# Patient Record
Sex: MALE | Race: WHITE | Employment: FULL TIME | URBAN - METROPOLITAN AREA
[De-identification: names, ages, dates, MRNs, and addresses within clinical notes are randomized per-mention and may not be internally consistent; named-entity substitution may affect disease eponyms.]

---

## 2022-03-06 ENCOUNTER — APPOINTMENT (OUTPATIENT)
Dept: GENERAL RADIOLOGY | Age: 37
End: 2022-03-06
Attending: EMERGENCY MEDICINE
Payer: COMMERCIAL

## 2022-03-06 ENCOUNTER — HOSPITAL ENCOUNTER (EMERGENCY)
Age: 37
Discharge: HOME | End: 2022-03-06
Attending: EMERGENCY MEDICINE
Payer: COMMERCIAL

## 2022-03-06 VITALS
SYSTOLIC BLOOD PRESSURE: 140 MMHG | WEIGHT: 145 LBS | OXYGEN SATURATION: 96 % | HEIGHT: 66 IN | DIASTOLIC BLOOD PRESSURE: 85 MMHG | RESPIRATION RATE: 18 BRPM | TEMPERATURE: 98 F | HEART RATE: 77 BPM | BODY MASS INDEX: 23.3 KG/M2

## 2022-03-06 DIAGNOSIS — S40.011A CONTUSION OF RIGHT SHOULDER, INITIAL ENCOUNTER: Primary | ICD-10-CM

## 2022-03-06 PROCEDURE — 73030 X-RAY EXAM OF SHOULDER: CPT | Performed by: EMERGENCY MEDICINE

## 2022-03-06 PROCEDURE — 99283 EMERGENCY DEPT VISIT LOW MDM: CPT

## 2022-03-07 NOTE — ED INITIAL ASSESSMENT (HPI)
Pt to ed with c/o right shoulder pain and limited ROM after a hockey game, PT states he ran into the boards with right shoulder.

## 2023-11-29 ENCOUNTER — OFFICE VISIT (OUTPATIENT)
Dept: FAMILY MEDICINE CLINIC | Facility: CLINIC | Age: 38
End: 2023-11-29
Payer: COMMERCIAL

## 2023-11-29 VITALS
SYSTOLIC BLOOD PRESSURE: 110 MMHG | HEART RATE: 78 BPM | OXYGEN SATURATION: 98 % | RESPIRATION RATE: 16 BRPM | BODY MASS INDEX: 24.16 KG/M2 | DIASTOLIC BLOOD PRESSURE: 74 MMHG | WEIGHT: 145 LBS | HEIGHT: 65 IN

## 2023-11-29 DIAGNOSIS — R35.0 FREQUENT URINATION: ICD-10-CM

## 2023-11-29 DIAGNOSIS — Z13.220 LIPID SCREENING: ICD-10-CM

## 2023-11-29 DIAGNOSIS — Z13.0 SCREENING FOR DEFICIENCY ANEMIA: ICD-10-CM

## 2023-11-29 DIAGNOSIS — Z13.29 SCREENING FOR THYROID DISORDER: ICD-10-CM

## 2023-11-29 DIAGNOSIS — Z00.00 WELLNESS EXAMINATION: Primary | ICD-10-CM

## 2023-11-29 PROCEDURE — 3008F BODY MASS INDEX DOCD: CPT | Performed by: FAMILY MEDICINE

## 2023-11-29 PROCEDURE — 99386 PREV VISIT NEW AGE 40-64: CPT | Performed by: FAMILY MEDICINE

## 2023-11-29 PROCEDURE — 3078F DIAST BP <80 MM HG: CPT | Performed by: FAMILY MEDICINE

## 2023-11-29 PROCEDURE — 3074F SYST BP LT 130 MM HG: CPT | Performed by: FAMILY MEDICINE

## 2024-06-28 ENCOUNTER — OFFICE VISIT (OUTPATIENT)
Dept: FAMILY MEDICINE CLINIC | Facility: CLINIC | Age: 39
End: 2024-06-28

## 2024-06-28 VITALS
SYSTOLIC BLOOD PRESSURE: 102 MMHG | HEART RATE: 48 BPM | WEIGHT: 147 LBS | BODY MASS INDEX: 24 KG/M2 | DIASTOLIC BLOOD PRESSURE: 60 MMHG | RESPIRATION RATE: 16 BRPM | OXYGEN SATURATION: 98 %

## 2024-06-28 DIAGNOSIS — Z30.09 FAMILY PLANNING: Primary | ICD-10-CM

## 2024-06-28 DIAGNOSIS — F41.9 ANXIETY AND DEPRESSION: ICD-10-CM

## 2024-06-28 DIAGNOSIS — F32.A ANXIETY AND DEPRESSION: ICD-10-CM

## 2024-06-28 PROCEDURE — 99214 OFFICE O/P EST MOD 30 MIN: CPT | Performed by: FAMILY MEDICINE

## 2024-06-28 RX ORDER — VENLAFAXINE HYDROCHLORIDE 75 MG/1
75 CAPSULE, EXTENDED RELEASE ORAL DAILY
Qty: 30 CAPSULE | Refills: 1 | Status: SHIPPED | OUTPATIENT
Start: 2024-06-28

## 2024-06-28 RX ORDER — VENLAFAXINE HYDROCHLORIDE 37.5 MG/1
37.5 CAPSULE, EXTENDED RELEASE ORAL DAILY
Qty: 14 CAPSULE | Refills: 0 | Status: SHIPPED | OUTPATIENT
Start: 2024-06-28

## 2024-06-28 NOTE — PROGRESS NOTES
[facility] Progress Note    SUBJECTIVE: Alexandr COONEY Paprzyca 38 year old male is here today for   Chief Complaint   Patient presents with    Referral     vasectomy    Depression       Wants vasectomy    Has been done, had been on lexapro before    Feeling down, depressed, lost his job recently.    Having irritability, and takes it out on family and doesn't like that.    Has been worsening, sense of hopelessness, down mood, also has anxiety    Can wake up with random memories. Hard time concentrating.    Hasn't been on any other medications, took lexapro for a couple of years 10 years ago and then again for a couple of months a year or so ago, and didn't help when recently used.    No suicidal thoguths.        PMH  No past medical history on file.     PSH  Past Surgical History:   Procedure Laterality Date    Elbow fracture surgery      [place] teeth removed          Social Hx:  No changes    ROS  See HPI    OBJECTIVE:  /60   Pulse (!) 48   Resp 16   Wt 147 lb (66.7 kg)   SpO2 98%   BMI 24.46 kg/m²         Labs:          Meds:   Current Outpatient Medications   Medication Sig Dispense Refill    venlafaxine ER 37.5 MG Oral Capsule SR 24 Hr Take 1 capsule (37.5 mg total) by mouth daily. 14 capsule 0    venlafaxine ER 75 MG Oral Capsule SR 24 Hr Take 1 capsule (75 mg total) by mouth daily. 30 capsule 1         Assessment/Plan  Alexandr Patton was seen today for referral and depression.    Diagnoses and all orders for this visit:    Family planning  -     Urology Referral - In Network    Anxiety and depression  -     [facility] Referral - In Network  -     venlafaxine ER 37.5 MG Oral Capsule SR 24 Hr; Take 1 capsule (37.5 mg total) by mouth daily.  -     venlafaxine ER 75 MG Oral Capsule SR 24 Hr; Take 1 capsule (75 mg total) by mouth daily.         Will get set up for urology for vasectomy    Discussed counseling and referred to [facility]    Will recommend starting medication advised on impact expected and  possible side effect         Total Time spent with patient and coordinating care:  25 minutes.    Follow up: 1 month      Mauricio Busch MD

## 2024-07-22 DIAGNOSIS — F41.9 ANXIETY AND DEPRESSION: ICD-10-CM

## 2024-07-22 DIAGNOSIS — F32.A ANXIETY AND DEPRESSION: ICD-10-CM

## 2024-07-22 RX ORDER — VENLAFAXINE HYDROCHLORIDE 75 MG/1
75 CAPSULE, EXTENDED RELEASE ORAL DAILY
Qty: 90 CAPSULE | Refills: 1 | Status: SHIPPED | OUTPATIENT
Start: 2024-07-22

## 2024-08-22 ENCOUNTER — OFFICE VISIT (OUTPATIENT)
Dept: SURGERY | Facility: CLINIC | Age: 39
End: 2024-08-22
Payer: COMMERCIAL

## 2024-08-22 DIAGNOSIS — N32.81 OAB (OVERACTIVE BLADDER): ICD-10-CM

## 2024-08-22 DIAGNOSIS — Z30.2 ENCOUNTER FOR STERILIZATION: Primary | ICD-10-CM

## 2024-08-22 PROCEDURE — 99204 OFFICE O/P NEW MOD 45 MIN: CPT | Performed by: SURGERY

## 2024-08-22 RX ORDER — DIAZEPAM 10 MG
10 TABLET ORAL SEE ADMIN INSTRUCTIONS
Qty: 1 TABLET | Refills: 0 | Status: SHIPPED | OUTPATIENT
Start: 2024-08-22

## 2024-08-22 NOTE — PROGRESS NOTES
Urology Clinic Note - New Patient    Referring Provider:  Mauricio Busch MD  [address] [place] [address]  [address]  [place] [address]     Primary Care Provider:  Mauricio Busch MD     Chief Complaint:   Referral for vasectomy, overactive bladder    HPI:   Alexandr Robert is a 39 year old male with no PMH referred for interest in male sterility via vasectomy.     He has 2 current children with his wife. He has thought about vasectomy for a while now and desires no further children.    Blood thinners: No  Prior bleeding problems: No  Prior scrotal surgery: No    He also notes occasional urinary urgency and urge incontinence (just a few drops typically) when he drinks energy drinks or caffeinated beverages.  He does not have the symptoms when he is not drinking caffeinated beverages.  I discussed that this is likely related to mild overactive bladder.  He denies weak urinary stream.    PSA:  No results found for: \"PSA\", \"PERCENTPSA\", \"PSAS\", \"PSAULTRA\"     History:   History reviewed. No pertinent past medical history.    Past Surgical History:   Procedure Laterality Date    Elbow fracture surgery      [place] teeth removed         Family History   Problem Relation Age of Onset    Other (brain tumor) Mother     Diabetes Mother        Social History     Socioeconomic History    Marital status:    Tobacco Use    Smoking status: Never    Smokeless tobacco: Never   Substance and Sexual Activity    Alcohol use: Yes     Comment: occ    Drug use: Never       Medications (Active prior to today's visit):  Current Outpatient Medications   Medication Sig Dispense Refill    venlafaxine ER 75 MG Oral Capsule SR 24 Hr Take 1 capsule (75 mg total) by mouth daily. 90 capsule 1       Allergies:  Allergies   Allergen Reactions    Avocado NAUSEA AND VOMITING    Banana NAUSEA AND VOMITING       Review of Systems:   A comprehensive 10-point review of systems was completed.  Pertinent positives and negatives are noted in the the  HPI.    Physical Exam:   CONSTITUTIONAL: Well developed, well nourished, in no acute distress  NEUROLOGIC: Alert and oriented  HEAD: Normocephalic, atraumatic  EYES: Sclera non-icteric  ENT: Hearing intact, moist mucous membranes  NECK: No obvious goiter or masses  RESPIRATORY: Normal respiratory effort  SKIN: No evident rashes  ABDOMEN: Soft, non-tender, non-distended  GENITOURINARY: Normal phallus, orthotopic meatus, normal bilateral testicles, palpable vasa bilaterally    Assessment & Plan:   Alexandr Robert is a 39 year old male referred for vasectomy consult.    We discussed that a vasectomy is intended to be a permanent form of contraception and that if he desires fertility after vasectomy, options included vasectomy reversal and sperm retrieval with possible in vitro fertilization. These options are not always successful and may be very expensive.    We also discussed the risks of vasectomy which include symptomatic hematoma and infection (1-2%), chronic scrotal pain (6%; caused by congestive epididymitis, sperm granuloma and/or infective epididymoorchitis), need for repeat vasectomy (<1% of cases), need for additional procedures, vasectomy failure, and unwanted pregnancy (1 in 2000 men). The chronic scrotal discomfort may be no more than a low-grade chronic ache that causes little disability and requires only symptomatic treatment. However, a small percentage of patients will be sufficiently debilitated to seek epididymectomy or even orchiectomy (removal of the epididymis and/or testicle). Furthermore, for a very small number of patients, even this radical surgery will not provide relief from their scrotal discomfort.     We discussed that he will have two small incisions in his scrotum with dissolvable sutures. He was told that vasectomy does NOT produce immediate sterility and that following vasectomy, another form of contraception is required until vas occlusion is confirmed by post-vasectomy semen  analysis. Post-vasectomy semen analysis will be obtained 10-12 weeks after the vasectomy. He was told that he may stop using other methods of contraception when examination of one well-mixed, uncentrifuged, fresh post-vasectomy semen specimen shows azoospermia or only rare non-motile sperm. He was once again told that even after vas occlusion is confirmed, vasectomy is not 100% reliable in preventing pregnancy and that the risk of pregnancy after vasectomy is approximately 1 in 2,000 men who have post-vasectomy azoospermia or semen analysis showing rare non-motile sperm. The reasons are early recanalization of the vas deferens or the presence of an accessory vas unrecognized at the time of surgery. There have also been cases of DNA-confirmed paternity despite documented azoospermia before and after conception. He was told to refrain from ejaculation for approximately one week after vasectomy.    I advised him to hold any blood thinners prior to this procedure, to trim the hair on the scrotum the day before the procedure, and to arrange for someone to drive him to and from the procedure if possible. He understands that for a few days after the procedure he will need to take it easy with minimal activity, ice the area, and keep the compression dressing/jock strap on. He understands and elects to proceed with vasectomy.    -Book for vasectomy under local anesthesia in clinic with me, next available  -Hold NSAIDs or Aspirin for 7 days before procedure  -Valium 10mg PRN before procedure (informed must have ride home if taking)  -Decrease caffeinated beverages to decrease OAB symptoms    Thank you for this consult.    I have personally reviewed all relevant medical records, labs, and imaging.      Medical Decision Making  Encounter for sterilization: Undiagnosed new problem  Overactive bladder: Undiagnosed new problem    Amount and/or Complexity of Data Reviewed  External Data Reviewed: notes.    Risk  Parenteral  controlled substances.  Minor surgery with no identified risk factors.      Gurmeet Brizuela MD  Staff Urologist  [facility]  Office: [phone]

## 2024-10-04 ENCOUNTER — PROCEDURE (OUTPATIENT)
Dept: SURGERY | Facility: CLINIC | Age: 39
End: 2024-10-04
Payer: COMMERCIAL

## 2024-10-04 VITALS — DIASTOLIC BLOOD PRESSURE: 74 MMHG | SYSTOLIC BLOOD PRESSURE: 104 MMHG

## 2024-10-04 DIAGNOSIS — Z30.2 ENCOUNTER FOR STERILIZATION: Primary | ICD-10-CM

## 2024-10-04 PROCEDURE — 55250 REMOVAL OF SPERM DUCT(S): CPT | Performed by: SURGERY

## 2024-10-04 NOTE — PROCEDURES
Clinic Procedure Note    INDICATIONS:   Alexandr Robert is a 39 year old male desiring elective sterility via bilateral vasectomy.     PROCEDURE:       1. Bilateral vasectomy    DATE OF PROCEDURE: 10/4/2024     PRE-PROCEDURE DIAGNOSIS: Family Planning/Desires Male Sterility    POST-PROCEDURE DIAGNOSIS: Same     SURGEON: Gurmeet Brizuela MD    ANESTHESIA: Local (10 mL of lidocaine 1% plain)    SPECIMENS: Segment of RIGHT vas, segment of LEFT vas    FINDINGS:  Normal bilateral vasectomy performed.    DISCUSSION:  We discussed that a vasectomy is intended to be a permanent form of contraception and that if he desires fertility after vasectomy, options included vasectomy reversal and sperm retrieval with possible in vitro fertilization. These options are not always successful and may be very expensive.    We also discussed the risks of vasectomy which include symptomatic hematoma and infection (1-2%), chronic scrotal pain (6%; caused by congestive epididymitis, sperm granuloma and/or infective epididymoorchitis), need for repeat vasectomy (<1% of cases), need for additional procedures, vasectomy failure, and unwanted pregnancy (1 in 2000 men). The chronic scrotal discomfort may be no more than a low-grade chronic ache that causes little disability and requires only symptomatic treatment. However, a small percentage of patients will be sufficiently debilitated to seek epididymectomy or even orchiectomy (removal of the epididymis and/or testicle). Furthermore, for a very small number of patients, even this radical surgery will not provide relief from their scrotal discomfort.     We discussed that he will have two small incisions in his scrotum with dissolvable sutures. He was told that vasectomy does NOT produce immediate sterility and that following vasectomy, another form of contraception is required until vas occlusion is confirmed by post-vasectomy semen analysis. Post-vasectomy semen analysis will be obtained  10-15 weeks after the vasectomy. He was told that he may stop using other methods of contraception when examination of one well-mixed, uncentrifuged, fresh post-vasectomy semen specimen shows azoospermia or only rare non-motile sperm. He was once again told that even after vas occlusion is confirmed, vasectomy is not 100% reliable in preventing pregnancy and that the risk of pregnancy after vasectomy is approximately 1 in 2,000 men who have post-vasectomy azoospermia or semen analysis showing rare non-motile sperm. The reasons are early recanalization of the vas deferens or the presence of an accessory vas unrecognized at the time of surgery. There have also been cases of DNA-confirmed paternity despite documented azoospermia before and after conception. He was told to refrain from ejaculation for approximately one week after vasectomy.    After discussion of all risks and benefits the patient elected to proceed and informed consent form was signed.    PROCEDURE:   After the appropriate time out checklist was performed, confirming the correct patient and procedure, the scrotum was prepped and draped in standard fashion.     The right vas was grasped and brought up underneath the skin surface. The skin, Dartos, and vasal sheath were injected with lidocaine. Once the skin was numb, a #15 scalpel was used to make a small incision overlying the vas in the right hemiscrotum. A small snap was used to spread the tissue overlying the vas to make room for the vas ring forceps. The ring forceps was used to grasp the vas deferens to hold it in place. I used the 15 blade to cut away the vasal sheath and used additional vas clamps to re-grasp the isolated vas.  I then pushed down any remaining attached vasal sheath and secured each end of the vas with a small mosquito clamp. Small metal clips were placed on the testicular and abdominal ends of the transected vas. A 1 cm segment of the vas was excised and passed off the field for  specimen. The needle tip Bovie was used to cauterize the lumen of both the abdominal and testicular ends of the transected vas. Any present bleeding vessels were treated with a combination of cautery and additional metals clips. There was no bleeding seen from the vasal stumps, and they were dropped back into the scrotum. The dartos muscle was cauterized. The skin incision was closed with running 3-0 chromic.     The left vas was grasped and brought up underneath the skin surface. The skin, Dartos, and vasal sheath were injected with lidocaine. Once the skin was numb, a #15 scalpel was used to make a small incision overlying the vas in the left hemiscrotum. A small snap was used to spread the tissue overlying the vas to make room for the vas ring forceps. The ring forceps was used to grasp the vas deferens to hold it in place. I used the 15 blade to cut away the vasal sheath and used additional vas clamps to re-grasp the isolated vas.  I then pushed down any remaining attached vasal sheath and secured each end of the vas with a small mosquito clamp. Small metal clips were placed on the testicular and abdominal ends of the transected vas. A 1 cm segment of the vas was excised and passed off the field for specimen. The needle tip Bovie was used to cauterize the lumen of both the abdominal and testicular ends of the transected vas. Any present bleeding vessels were treated with a combination of cautery and additional metals clips. There was no bleeding seen from the vasal stumps, and they were dropped back into the scrotum. The dartos muscle was cauterized. The skin incision was closed with running 3-0 chromic.     Fluff gauze and scrotal support were placed.     DISPOSITION: Home    FOLLOW-UP: Post-procedure care instructions were given to the patient. Post-vasectomy semen analysis will be performed 10-15 weeks after vasectomy. Patient knows to use another form of contraception until vasal occlusion is confirmed by  post-vasectomy semen analysis.      Gurmeet Brizuela MD  Staff Urologist  [facility]  Office: [phone]

## 2024-10-30 ENCOUNTER — TELEPHONE (OUTPATIENT)
Dept: SURGERY | Facility: CLINIC | Age: 39
End: 2024-10-30

## 2024-10-30 ENCOUNTER — PATIENT MESSAGE (OUTPATIENT)
Dept: SURGERY | Facility: CLINIC | Age: 39
End: 2024-10-30

## 2024-10-30 NOTE — TELEPHONE ENCOUNTER
My chart message:  Hi Dr. Brizuela,  I received a vasectomy just less than a month ago. Everything had been feeling very well until just recently. In the last few days I’ve been having minor aches in my right testicle. It’s not painful but feels more like soreness. I haven’t noticed any swelling or bruising but it’s been consistent. I take some ibuprofen that helps but it is persistent.     Please assist. Thank you.

## 2024-10-31 NOTE — TELEPHONE ENCOUNTER
This encounter is now closed.     RN replied to patient via [employer] with recommendations  RN also forwarded to PA to advise

## 2025-01-26 DIAGNOSIS — F41.9 ANXIETY AND DEPRESSION: ICD-10-CM

## 2025-01-26 DIAGNOSIS — F32.A ANXIETY AND DEPRESSION: ICD-10-CM

## 2025-01-27 RX ORDER — VENLAFAXINE HYDROCHLORIDE 75 MG/1
75 CAPSULE, EXTENDED RELEASE ORAL DAILY
Qty: 90 CAPSULE | Refills: 0 | Status: SHIPPED | OUTPATIENT
Start: 2025-01-27

## 2025-04-14 ENCOUNTER — LAB ENCOUNTER (OUTPATIENT)
Dept: LAB | Age: 40
End: 2025-04-14
Attending: FAMILY MEDICINE
Payer: COMMERCIAL

## 2025-04-14 ENCOUNTER — OFFICE VISIT (OUTPATIENT)
Dept: FAMILY MEDICINE CLINIC | Facility: CLINIC | Age: 40
End: 2025-04-14
Payer: COMMERCIAL

## 2025-04-14 VITALS
SYSTOLIC BLOOD PRESSURE: 104 MMHG | HEIGHT: 65 IN | WEIGHT: 141 LBS | HEART RATE: 62 BPM | RESPIRATION RATE: 16 BRPM | DIASTOLIC BLOOD PRESSURE: 64 MMHG | OXYGEN SATURATION: 97 % | BODY MASS INDEX: 23.49 KG/M2

## 2025-04-14 DIAGNOSIS — Z13.29 THYROID DISORDER SCREEN: ICD-10-CM

## 2025-04-14 DIAGNOSIS — Z13.0 SCREENING FOR DEFICIENCY ANEMIA: ICD-10-CM

## 2025-04-14 DIAGNOSIS — F32.A ANXIETY AND DEPRESSION: ICD-10-CM

## 2025-04-14 DIAGNOSIS — Z00.00 WELLNESS EXAMINATION: Primary | ICD-10-CM

## 2025-04-14 DIAGNOSIS — Z12.5 SCREENING FOR PROSTATE CANCER: ICD-10-CM

## 2025-04-14 DIAGNOSIS — Z13.220 LIPID SCREENING: ICD-10-CM

## 2025-04-14 DIAGNOSIS — R68.82 LIBIDO, DECREASED: ICD-10-CM

## 2025-04-14 DIAGNOSIS — F41.9 ANXIETY AND DEPRESSION: ICD-10-CM

## 2025-04-14 LAB
ALBUMIN SERPL-MCNC: 4.9 G/DL (ref 3.2–4.8)
ALBUMIN/GLOB SERPL: 2.2 {RATIO} (ref 1–2)
ALP LIVER SERPL-CCNC: 61 U/L (ref 45–117)
ALT SERPL-CCNC: 26 U/L (ref 10–49)
ANION GAP SERPL CALC-SCNC: 9 MMOL/L (ref 0–18)
AST SERPL-CCNC: 24 U/L (ref ?–34)
BASOPHILS # BLD AUTO: 0.06 X10(3) UL (ref 0–0.2)
BASOPHILS NFR BLD AUTO: 0.7 %
BILIRUB SERPL-MCNC: 0.6 MG/DL (ref 0.3–1.2)
BUN BLD-MCNC: 20 MG/DL (ref 9–23)
CALCIUM BLD-MCNC: 10.4 MG/DL (ref 8.7–10.6)
CHLORIDE SERPL-SCNC: 105 MMOL/L (ref 98–112)
CHOLEST SERPL-MCNC: 171 MG/DL (ref ?–200)
CO2 SERPL-SCNC: 28 MMOL/L (ref 21–32)
COMPLEXED PSA SERPL-MCNC: 0.51 NG/ML (ref ?–4)
CREAT BLD-MCNC: 1.11 MG/DL (ref 0.7–1.3)
EGFRCR SERPLBLD CKD-EPI 2021: 86 ML/MIN/1.73M2 (ref 60–?)
EOSINOPHIL # BLD AUTO: 0.37 X10(3) UL (ref 0–0.7)
EOSINOPHIL NFR BLD AUTO: 4.1 %
ERYTHROCYTE [DISTWIDTH] IN BLOOD BY AUTOMATED COUNT: 12 %
FASTING PATIENT LIPID ANSWER: NO
FASTING STATUS PATIENT QL REPORTED: NO
GLOBULIN PLAS-MCNC: 2.2 G/DL (ref 2–3.5)
GLUCOSE BLD-MCNC: 107 MG/DL (ref 70–99)
HCT VFR BLD AUTO: 46.6 % (ref 39–53)
HDLC SERPL-MCNC: 61 MG/DL (ref 40–59)
HGB BLD-MCNC: 16.1 G/DL (ref 13–17.5)
IMM GRANULOCYTES # BLD AUTO: 0.03 X10(3) UL (ref 0–1)
IMM GRANULOCYTES NFR BLD: 0.3 %
LDLC SERPL CALC-MCNC: 91 MG/DL (ref ?–100)
LYMPHOCYTES # BLD AUTO: 2.46 X10(3) UL (ref 1–4)
LYMPHOCYTES NFR BLD AUTO: 27.1 %
MCH RBC QN AUTO: 32.2 PG (ref 26–34)
MCHC RBC AUTO-ENTMCNC: 34.5 G/DL (ref 31–37)
MCV RBC AUTO: 93.2 FL (ref 80–100)
MONOCYTES # BLD AUTO: 0.67 X10(3) UL (ref 0.1–1)
MONOCYTES NFR BLD AUTO: 7.4 %
NEUTROPHILS # BLD AUTO: 5.48 X10 (3) UL (ref 1.5–7.7)
NEUTROPHILS # BLD AUTO: 5.48 X10(3) UL (ref 1.5–7.7)
NEUTROPHILS NFR BLD AUTO: 60.4 %
NONHDLC SERPL-MCNC: 110 MG/DL (ref ?–130)
OSMOLALITY SERPL CALC.SUM OF ELEC: 297 MOSM/KG (ref 275–295)
PLATELET # BLD AUTO: 267 10(3)UL (ref 150–450)
POTASSIUM SERPL-SCNC: 4.7 MMOL/L (ref 3.5–5.1)
PROT SERPL-MCNC: 7.1 G/DL (ref 5.7–8.2)
RBC # BLD AUTO: 5 X10(6)UL (ref 4.3–5.7)
SODIUM SERPL-SCNC: 142 MMOL/L (ref 136–145)
T3FREE SERPL-MCNC: 2.82 PG/ML (ref 2.4–4.2)
T4 FREE SERPL-MCNC: 1.1 NG/DL (ref 0.8–1.7)
TESTOST SERPL-MCNC: 428.66 NG/DL (ref 197.44–669.58)
TRIGL SERPL-MCNC: 109 MG/DL (ref 30–149)
TSI SER-ACNC: 0.54 UIU/ML (ref 0.55–4.78)
VLDLC SERPL CALC-MCNC: 18 MG/DL (ref 0–30)
WBC # BLD AUTO: 9.1 X10(3) UL (ref 4–11)

## 2025-04-14 PROCEDURE — 80061 LIPID PANEL: CPT | Performed by: FAMILY MEDICINE

## 2025-04-14 PROCEDURE — 84443 ASSAY THYROID STIM HORMONE: CPT | Performed by: FAMILY MEDICINE

## 2025-04-14 PROCEDURE — 84439 ASSAY OF FREE THYROXINE: CPT | Performed by: FAMILY MEDICINE

## 2025-04-14 PROCEDURE — 36415 COLL VENOUS BLD VENIPUNCTURE: CPT | Performed by: FAMILY MEDICINE

## 2025-04-14 PROCEDURE — 80053 COMPREHEN METABOLIC PANEL: CPT | Performed by: FAMILY MEDICINE

## 2025-04-14 PROCEDURE — 84481 FREE ASSAY (FT-3): CPT | Performed by: FAMILY MEDICINE

## 2025-04-14 PROCEDURE — 84403 ASSAY OF TOTAL TESTOSTERONE: CPT | Performed by: FAMILY MEDICINE

## 2025-04-14 PROCEDURE — 85025 COMPLETE CBC W/AUTO DIFF WBC: CPT | Performed by: FAMILY MEDICINE

## 2025-04-14 PROCEDURE — 99396 PREV VISIT EST AGE 40-64: CPT | Performed by: FAMILY MEDICINE

## 2025-04-14 RX ORDER — CHLORHEXIDINE GLUCONATE ORAL RINSE 1.2 MG/ML
SOLUTION DENTAL
COMMUNITY
Start: 2025-04-12

## 2025-04-14 NOTE — PROGRESS NOTES
HPI:   Alexandr Robert is a 39 year old male who presents for an Annual Health Visit.     Had a vasectomy in October, about a month ago had a pain in one of his testicles, thought it was due to working out but persisted. Riding a bike was uncomfortable, and running too.    The venlafaxine hadn't caused sexual dysfunction. Usd to be motivatd somewhat with fear of weight gain, and exercised more. Feels like motivation down, and losing his drive. Weight stable, but feels like rebalanced.    Libido has been down since this year. Was ill around xmas into new year. Seems like since then, was negative for covid and flu.    Has jareth feeling more down lately. Not so much about his own life.    Social:  Mom has been sick.      Allergies:     Allergies   Allergen Reactions    Avocado NAUSEA AND VOMITING    Banana NAUSEA AND VOMITING       CURRENT MEDICATIONS   Current Outpatient Medications   Medication Sig Dispense Refill    chlorhexidine gluconate 0.12 % Mouth/Throat Solution       venlafaxine ER 75 MG Oral Capsule SR 24 Hr TAKE 1 CAPSULE BY MOUTH EVERY DAY 90 capsule 0      HISTORICAL INFORMATION   History reviewed. No pertinent past medical history.   Past Surgical History:   Procedure Laterality Date    Elbow fracture surgery      [place] teeth removed        Family History   Problem Relation Age of Onset    Other (brain tumor) Mother     Diabetes Mother       SOCIAL HISTORY   Social History     Socioeconomic History    Marital status:    Tobacco Use    Smoking status: Never    Smokeless tobacco: Never   Substance and Sexual Activity    Alcohol use: Yes     Comment: occ    Drug use: Never     Social History     Social History Narrative    Not on file        REVIEW OF SYSTEMS:     Constitutional: negative  Eyes:  had some vision changes in the fall, and followed with eye doctor. Resolved after a while.  ENT: negative  Respiratory: negative  Cardiovascular: negative  Gastrointestinal:  negative  Integument/Breast: negative  Genitourinary:  frequent urination  Heme/Lymph: negative  Musculoskeletal:  hands feel puffy, dull soreness.  Neurological: negative  Psych:  see HPI, will check labs and adjust meds if normal  Endocrine: negative  Allergic/Immune: negative    EXAM:   /64   Pulse 62   Resp 16   Ht 5' 5\" (1.651 m)   Wt 141 lb (64 kg)   SpO2 97%   BMI 23.46 kg/m²    Wt Readings from Last 6 Encounters:   04/14/25 141 lb (64 kg)   06/28/24 147 lb (66.7 kg)   11/29/23 145 lb (65.8 kg)   03/06/22 145 lb (65.8 kg)     Body mass index is 23.46 kg/m².    General: alert, appears stated age, and cooperative  Head: Normocephalic, without obvious abnormality, atraumatic  Eyes: conjunctivae/corneas clear. PERRL, EOM's intact. Fundi benign.  Ears: normal TM's and external ear canals both ears  Nose: Nares normal. Septum midline. Mucosa normal. No drainage or sinus tenderness.  Throat: lips, mucosa, and tongue normal; teeth and gums normal  Neck: no adenopathy, no carotid bruit, no JVD, supple, symmetrical, trachea midline, and thyroid not enlarged, symmetric, no tenderness/mass/nodules  Heart: S1, S2 normal, no murmur, click, rub or gallop, regular rate and rhythm  Lungs: clear to auscultation bilaterally  Chest wall: no tenderness  Abdomen: soft, non-tender; bowel sounds normal; no masses,  no organomegaly  : deferred  Back: symmetric, no curvature. ROM normal. No CVA tenderness.  Extremities: extremities normal, atraumatic, no cyanosis or edema  Pulses: 2+ and symmetric  Skin: Skin color, texture, turgor normal. No rashes or lesions  Lymph Nodes: Cervical, supraclavicular, and axillary nodes normal.  Neurologic: Grossly normal    ASSESSMENT AND PLAN:   Diagnoses and all orders for this visit:    Wellness examination  -     Comp Metabolic Panel (14)  -     CBC With Differential With Platelet  -     Lipid Panel  -     TSH W Reflex To Free T4    Lipid screening  -     Lipid Panel    Screening for  deficiency anemia  -     CBC With Differential With Platelet    Thyroid disorder screen  -     TSH W Reflex To Free T4    Libido, decreased  -     Testosterone Total [E]    Screening for prostate cancer  -     PSA, Total (Screening) [E]; Future    Anxiety and depression    Mood not optimally controlled, there are some factors impacting this, but plan on labs and if normal will adjust venlafaxine down and see how he does, and if improved consider new medication if worsened could push to 150 mg instead    If testicle pain returns consider US  There are no Patient Instructions on file for this visit.    The patient indicates understanding of these issues and agrees to the plan.    Problem List:  There is no problem list on file for this patient.      Mauricio Busch MD  4/14/2025  9:32 AM

## 2025-04-29 DIAGNOSIS — F32.A ANXIETY AND DEPRESSION: ICD-10-CM

## 2025-04-29 DIAGNOSIS — F41.9 ANXIETY AND DEPRESSION: ICD-10-CM

## 2025-04-29 RX ORDER — VENLAFAXINE HYDROCHLORIDE 75 MG/1
75 CAPSULE, EXTENDED RELEASE ORAL DAILY
Qty: 90 CAPSULE | Refills: 1 | Status: SHIPPED | OUTPATIENT
Start: 2025-04-29